# Patient Record
Sex: MALE | Race: BLACK OR AFRICAN AMERICAN | NOT HISPANIC OR LATINO | URBAN - METROPOLITAN AREA
[De-identification: names, ages, dates, MRNs, and addresses within clinical notes are randomized per-mention and may not be internally consistent; named-entity substitution may affect disease eponyms.]

---

## 2021-08-27 ENCOUNTER — OFFICE VISIT (OUTPATIENT)
Dept: URBAN - METROPOLITAN AREA SURGERY CENTER 19 | Facility: SURGERY CENTER | Age: 46
End: 2021-08-27
Payer: COMMERCIAL

## 2021-08-27 DIAGNOSIS — Z12.11 AVERAGE RISK FOR CRC. DUE TO PT'S CO-MORBID STATE WITH END STAGE DEMENTIA, HIGH RISK FOR ANESTHESIA (PER NEUROLOGY); INABILITY TO TAKE A BOWEL PREP....WOULD NOT ADVISE ANY COLORECTAL CANCER SCREENING INCLUDING STOOL TEST FOR FECAL BLOOD.: ICD-10-CM

## 2021-08-27 PROCEDURE — 45378 DIAGNOSTIC COLONOSCOPY: CPT | Performed by: INTERNAL MEDICINE

## 2022-01-06 ENCOUNTER — OFFICE VISIT (OUTPATIENT)
Dept: URBAN - METROPOLITAN AREA CLINIC 74 | Facility: CLINIC | Age: 47
End: 2022-01-06

## 2022-02-03 ENCOUNTER — LAB OUTSIDE AN ENCOUNTER (OUTPATIENT)
Dept: URBAN - METROPOLITAN AREA CLINIC 74 | Facility: CLINIC | Age: 47
End: 2022-02-03

## 2022-02-03 ENCOUNTER — OFFICE VISIT (OUTPATIENT)
Dept: URBAN - METROPOLITAN AREA CLINIC 74 | Facility: CLINIC | Age: 47
End: 2022-02-03
Payer: COMMERCIAL

## 2022-02-03 VITALS
HEIGHT: 68 IN | DIASTOLIC BLOOD PRESSURE: 90 MMHG | BODY MASS INDEX: 47.74 KG/M2 | HEART RATE: 71 BPM | TEMPERATURE: 97.7 F | OXYGEN SATURATION: 96 % | SYSTOLIC BLOOD PRESSURE: 140 MMHG | WEIGHT: 315 LBS

## 2022-02-03 DIAGNOSIS — R05.3 CHRONIC COUGH: ICD-10-CM

## 2022-02-03 DIAGNOSIS — R10.31 RIGHT LOWER QUADRANT ABDOMINAL PAIN: ICD-10-CM

## 2022-02-03 DIAGNOSIS — K21.9 GERD WITHOUT ESOPHAGITIS: ICD-10-CM

## 2022-02-03 PROCEDURE — 99204 OFFICE O/P NEW MOD 45 MIN: CPT | Performed by: INTERNAL MEDICINE

## 2022-02-03 RX ORDER — BENAZEPRIL HYDROCHLORIDE 10 MG/1
1 TABLET TABLET, FILM COATED ORAL ONCE A DAY
Status: ACTIVE | COMMUNITY

## 2022-02-03 NOTE — PHYSICAL EXAM CONSTITUTIONAL:
morbidly obese , in no acute distress , ambulating without difficulty , normal communication ability

## 2022-02-03 NOTE — HPI-TODAY'S VISIT:
47-year-old -American male here for fatty liver disease.  He has been experiencing right lower quadrant abdominal pain for more than a month.  It is a constant pain.  Complete abdominal ultrasound only showed fatty liver- was otherwise okay.  He has hypertension and hyperlipidemia.  He is morbidly obese as well.  Occasional alcohol.  LFTs were normal.  Colonoscopy last year was normal per the patient.  Maternal grandfather had colon cancer at over 60 years of age.  No prior upper endoscopy.  Intermittent acid reflux.  Has a chronic cough and globus sensation.

## 2022-02-03 NOTE — PHYSICAL EXAM GASTROINTESTINAL
Abdomen , soft, TTP in RLQ nondistended , no guarding or rigidity , no masses palpable , normal bowel sounds , Liver and Spleen , no hepatomegaly present , no hepatosplenomegaly , liver nontender , spleen not palpable

## 2022-02-04 ENCOUNTER — TELEPHONE ENCOUNTER (OUTPATIENT)
Dept: URBAN - METROPOLITAN AREA CLINIC 96 | Facility: CLINIC | Age: 47
End: 2022-02-04

## 2022-02-11 ENCOUNTER — TELEPHONE ENCOUNTER (OUTPATIENT)
Dept: URBAN - METROPOLITAN AREA CLINIC 74 | Facility: CLINIC | Age: 47
End: 2022-02-11

## 2022-02-16 ENCOUNTER — TELEPHONE ENCOUNTER (OUTPATIENT)
Dept: URBAN - METROPOLITAN AREA CLINIC 23 | Facility: CLINIC | Age: 47
End: 2022-02-16

## 2022-03-01 ENCOUNTER — TELEPHONE ENCOUNTER (OUTPATIENT)
Dept: URBAN - METROPOLITAN AREA CLINIC 40 | Facility: CLINIC | Age: 47
End: 2022-03-01

## 2022-03-01 ENCOUNTER — OFFICE VISIT (OUTPATIENT)
Dept: URBAN - METROPOLITAN AREA MEDICAL CENTER 30 | Facility: MEDICAL CENTER | Age: 47
End: 2022-03-01

## 2022-03-02 ENCOUNTER — TELEPHONE ENCOUNTER (OUTPATIENT)
Dept: URBAN - METROPOLITAN AREA CLINIC 74 | Facility: CLINIC | Age: 47
End: 2022-03-02

## 2022-03-15 ENCOUNTER — OFFICE VISIT (OUTPATIENT)
Dept: URBAN - METROPOLITAN AREA MEDICAL CENTER 9 | Facility: MEDICAL CENTER | Age: 47
End: 2022-03-15
Payer: COMMERCIAL

## 2022-03-15 ENCOUNTER — OFFICE VISIT (OUTPATIENT)
Dept: URBAN - METROPOLITAN AREA MEDICAL CENTER 9 | Facility: MEDICAL CENTER | Age: 47
End: 2022-03-15

## 2022-03-15 DIAGNOSIS — K22.2 ACQUIRED ESOPHAGEAL RING: ICD-10-CM

## 2022-03-15 DIAGNOSIS — R13.19 CERVICAL DYSPHAGIA: ICD-10-CM

## 2022-03-15 DIAGNOSIS — K22.89 ESOPHAGEAL BLEED, NON-VARICEAL: ICD-10-CM

## 2022-03-15 DIAGNOSIS — R10.13 ABDOMINAL DISCOMFORT, EPIGASTRIC: ICD-10-CM

## 2022-03-15 DIAGNOSIS — K29.60 ADENOPAPILLOMATOSIS GASTRICA: ICD-10-CM

## 2022-03-15 PROCEDURE — 43450 DILATE ESOPHAGUS 1/MULT PASS: CPT | Performed by: INTERNAL MEDICINE

## 2022-03-15 PROCEDURE — 43239 EGD BIOPSY SINGLE/MULTIPLE: CPT | Performed by: INTERNAL MEDICINE

## 2022-03-22 ENCOUNTER — TELEPHONE ENCOUNTER (OUTPATIENT)
Dept: URBAN - METROPOLITAN AREA CLINIC 92 | Facility: CLINIC | Age: 47
End: 2022-03-22

## 2022-03-22 ENCOUNTER — OFFICE VISIT (OUTPATIENT)
Dept: URBAN - METROPOLITAN AREA TELEHEALTH 2 | Facility: TELEHEALTH | Age: 47
End: 2022-03-22
Payer: COMMERCIAL

## 2022-03-22 VITALS — BODY MASS INDEX: 47.74 KG/M2 | HEIGHT: 68 IN | WEIGHT: 315 LBS

## 2022-03-22 DIAGNOSIS — K21.9 GERD WITHOUT ESOPHAGITIS: ICD-10-CM

## 2022-03-22 DIAGNOSIS — R05.3 CHRONIC COUGH: ICD-10-CM

## 2022-03-22 DIAGNOSIS — R10.31 RIGHT LOWER QUADRANT ABDOMINAL PAIN: ICD-10-CM

## 2022-03-22 PROCEDURE — 99213 OFFICE O/P EST LOW 20 MIN: CPT | Performed by: INTERNAL MEDICINE

## 2022-03-22 RX ORDER — PANTOPRAZOLE SODIUM 20 MG/1
1 TABLET TABLET, DELAYED RELEASE ORAL ONCE A DAY
Qty: 90 TABLET | Refills: 2

## 2022-03-22 RX ORDER — BENAZEPRIL HYDROCHLORIDE 10 MG/1
1 TABLET TABLET, FILM COATED ORAL ONCE A DAY
Status: ACTIVE | COMMUNITY

## 2022-03-22 RX ORDER — PANTOPRAZOLE SODIUM 40 MG/1
1 TABLET TABLET, DELAYED RELEASE ORAL ONCE A DAY
Status: ACTIVE | COMMUNITY

## 2022-03-22 NOTE — HPI-TODAY'S VISIT:
47-year-old -American male here for fatty liver disease.  He has been experiencing right lower quadrant abdominal pain for more than a month. Getting better with protonix. Complete abdominal ultrasound only showed fatty liver- was otherwise okay.  He has hypertension and hyperlipidemia.  He is morbidly obese as well.  Occasional alcohol.  LFTs were normal.  Colonoscopy last year was normal per the patient.  Maternal grandfather had colon cancer at over 60 years of age.  Intermittent acid reflux.  Has a chronic cough and globus sensation-manolo now.  EGD from last week-esophageal stricture dilated with 52 Fr Vazquez/mild gastritis. Bx: no h.pylori. CT showed-no acute abnormality. fatty liver. encouraged to lose weight

## 2022-03-29 ENCOUNTER — OFFICE VISIT (OUTPATIENT)
Dept: URBAN - METROPOLITAN AREA SURGERY CENTER 30 | Facility: SURGERY CENTER | Age: 47
End: 2022-03-29

## 2022-06-16 ENCOUNTER — OFFICE VISIT (OUTPATIENT)
Dept: URBAN - METROPOLITAN AREA MEDICAL CENTER 9 | Facility: MEDICAL CENTER | Age: 47
End: 2022-06-16

## 2022-09-27 ENCOUNTER — OFFICE VISIT (OUTPATIENT)
Dept: URBAN - METROPOLITAN AREA CLINIC 40 | Facility: CLINIC | Age: 47
End: 2022-09-27

## 2022-10-04 ENCOUNTER — OFFICE VISIT (OUTPATIENT)
Dept: URBAN - METROPOLITAN AREA CLINIC 40 | Facility: CLINIC | Age: 47
End: 2022-10-04
Payer: COMMERCIAL

## 2022-10-04 VITALS
DIASTOLIC BLOOD PRESSURE: 62 MMHG | HEART RATE: 78 BPM | SYSTOLIC BLOOD PRESSURE: 110 MMHG | WEIGHT: 315 LBS | TEMPERATURE: 97.5 F | BODY MASS INDEX: 47.74 KG/M2 | HEIGHT: 68 IN

## 2022-10-04 DIAGNOSIS — K22.2 ESOPHAGEAL STRICTURE: ICD-10-CM

## 2022-10-04 DIAGNOSIS — K21.9 GERD WITHOUT ESOPHAGITIS: ICD-10-CM

## 2022-10-04 DIAGNOSIS — R10.31 RIGHT LOWER QUADRANT ABDOMINAL PAIN: ICD-10-CM

## 2022-10-04 DIAGNOSIS — R05.3 CHRONIC COUGH: ICD-10-CM

## 2022-10-04 PROBLEM — 266435005: Status: ACTIVE | Noted: 2022-02-03

## 2022-10-04 PROBLEM — 68154008: Status: ACTIVE | Noted: 2022-02-03

## 2022-10-04 PROBLEM — 63305008: Status: ACTIVE | Noted: 2022-10-04

## 2022-10-04 PROCEDURE — 99213 OFFICE O/P EST LOW 20 MIN: CPT | Performed by: INTERNAL MEDICINE

## 2022-10-04 RX ORDER — PANTOPRAZOLE SODIUM 20 MG/1
1 TABLET TABLET, DELAYED RELEASE ORAL ONCE A DAY
Qty: 90 TABLET | Refills: 2 | Status: ACTIVE | COMMUNITY

## 2022-10-04 RX ORDER — BENAZEPRIL HYDROCHLORIDE 10 MG/1
1 TABLET TABLET, FILM COATED ORAL ONCE A DAY
Status: ACTIVE | COMMUNITY

## 2022-10-04 NOTE — HPI-TODAY'S VISIT:
47-year-old -American male here for fatty liver disease.  Complete abdominal ultrasound only showed fatty liver- was otherwise okay.  He has hypertension and hyperlipidemia.  He is morbidly obese as well.  started ozempic today. Occasional alcohol.  LFTs were normal.  Colonoscopy last year was normal per the patient.  Maternal grandfather had colon cancer at over 60 years of age.  Intermittent acid reflux.  not needing protonix now. Has a chronic cough and globus sensation-better now.  EGD from earlier this year-esophageal stricture dilated with 52 Fr Vazquez/mild gastritis. Bx: no h.pylori. No further dysphagia. CT showed-no acute abnormality. fatty liver. encouraged to lose weight

## 2023-09-11 PROBLEM — 197321007: Status: ACTIVE | Noted: 2023-09-11

## 2023-10-13 ENCOUNTER — LAB OUTSIDE AN ENCOUNTER (OUTPATIENT)
Dept: URBAN - METROPOLITAN AREA CLINIC 74 | Facility: CLINIC | Age: 48
End: 2023-10-13

## 2023-10-13 ENCOUNTER — OFFICE VISIT (OUTPATIENT)
Dept: URBAN - METROPOLITAN AREA CLINIC 74 | Facility: CLINIC | Age: 48
End: 2023-10-13
Payer: COMMERCIAL

## 2023-10-13 VITALS
HEART RATE: 79 BPM | DIASTOLIC BLOOD PRESSURE: 100 MMHG | BODY MASS INDEX: 47.74 KG/M2 | WEIGHT: 315 LBS | TEMPERATURE: 97.6 F | SYSTOLIC BLOOD PRESSURE: 140 MMHG | HEIGHT: 68 IN

## 2023-10-13 DIAGNOSIS — K76.9 LIVER LESION: ICD-10-CM

## 2023-10-13 DIAGNOSIS — R16.0 HEPATOMEGALY: ICD-10-CM

## 2023-10-13 DIAGNOSIS — R74.01 TRANSAMINITIS: ICD-10-CM

## 2023-10-13 DIAGNOSIS — K76.0 HEPATIC STEATOSIS: ICD-10-CM

## 2023-10-13 PROBLEM — 300331000: Status: ACTIVE | Noted: 2023-10-13

## 2023-10-13 PROCEDURE — 99213 OFFICE O/P EST LOW 20 MIN: CPT | Performed by: PHYSICIAN ASSISTANT

## 2023-10-13 RX ORDER — PANTOPRAZOLE SODIUM 20 MG/1
1 TABLET TABLET, DELAYED RELEASE ORAL ONCE A DAY
Qty: 90 TABLET | Refills: 2 | Status: ACTIVE | COMMUNITY

## 2023-10-13 RX ORDER — BENAZEPRIL HYDROCHLORIDE 10 MG/1
1 TABLET TABLET, FILM COATED ORAL ONCE A DAY
Status: ACTIVE | COMMUNITY

## 2023-10-13 RX ORDER — SEMAGLUTIDE 0.68 MG/ML
AS DIRECTED INJECTION, SOLUTION SUBCUTANEOUS
Status: ACTIVE | COMMUNITY

## 2023-10-13 NOTE — HPI-TODAY'S VISIT:
The patient is 48-year-old male with past medical history as noted below known to [facility] Dr. Farnsworth and Dr. Morales is presenting to our clinic today for evaluation of fatty liver. No GI issues today.   -- The patient denies dyspepsia, dysphagia, odynophagia, hemoptysis, hematemesis, nausea, vomiting, regurgitation, melena, constipation, diarrhea, abdominal pain, hematochezia, fever, chills, chest pain, SOB, or any other GI complaints today. -- The patient denies ETOH, Tobacco, and Illicit drug use. -- The patient is up to date with Flu and COVID vaccine. -- Denies NSAID's.   Diagnostic studies: -- RUQ US on 08/17/2023 with hepatomegaly and diffuse hepatic steatosis. Ill-defined hypoechoic lesion within the central liver, favored to represent focal fatty sparing albeit indeterminate. Further imaging might be considered as detailed above. Limited examination with nonvisualization of the common bile duct.  -- CT scan on 02/21/2022 with no acute intra-abdominal abnormality.  Scattered diverticula of the colon without evidence of diverticulitis.  Liver with possible mild fatty infiltration.  Gallbladder, pancreas, spleen, adrenal glands, and kidneys are normal.   Procedures: -- EGD with biopsy on 03/15/2022 by Dr. Morales noted LA grade B reflux esophagitis with no bleeding.  Benign-appearing esophageal stenosis.  Dilated.  Chronic gastritis.  Normal examined duodenum.  Biopsy with mild chronic gastritis.  No H.pylori organisms.  No intestinal metaplasia.  -- Colonoscopy on 08/27/2021 by Dr. Farnsworth noted internal hemorrhoids.  The examination was otherwise normal.  No specimen collected.  Repeat colonoscopy in 5 years for surveillance.

## 2023-10-26 LAB
% SATURATION: 28
A/G RATIO: 1.6
ABSOLUTE BASOPHILS: 30
ABSOLUTE EOSINOPHILS: 130
ABSOLUTE LYMPHOCYTES: 1775
ABSOLUTE MONOCYTES: 380
ABSOLUTE NEUTROPHILS: 2685
ACTIN (SMOOTH MUSCLE) ANTIBODY (IGG): <20
ALBUMIN: 3.9
ALKALINE PHOSPHATASE: 68
ALT (SGPT): 47
ANA SCREEN, IFA: NEGATIVE
AST (SGOT): 21
BASOPHILS: 0.6
BILIRUBIN, TOTAL: 0.4
BUN/CREATININE RATIO: 13
BUN: 19
CALCIUM: 9.3
CARBON DIOXIDE, TOTAL: 27
CERULOPLASMIN: 28
CHLORIDE: 107
CREATININE: 1.49
EGFR: 58
EOSINOPHILS: 2.6
FERRITIN: 310
GGT: 23
GLOBULIN, TOTAL: 2.5
GLUCOSE: 95
HBSAG SCREEN: (no result)
HEMATOCRIT: 39.7
HEMOGLOBIN: 13.6
HEP A AB, IGM: (no result)
HEP B CORE AB, IGM: (no result)
HEPATITIS C ANTIBODY: (no result)
HEREDITARY HEMOCHROMATOSIS DNA MUT: (no result)
INR: 1
IRON BINDING CAPACITY: 293
IRON, TOTAL: 83
LYMPHOCYTES: 35.5
MCH: 30.5
MCHC: 34.3
MCV: 89
MITOCHONDRIAL (M2) ANTIBODY: <=20
MONOCYTES: 7.6
MPV: 10.3
NEUTROPHILS: 53.7
PLATELET COUNT: 280
POTASSIUM: 4.6
PROTEIN, TOTAL: 6.4
PT: 10.3
RDW: 13.1
RED BLOOD CELL COUNT: 4.46
RHEUMATOID FACTOR: <14
SJOGREN'S ANTIBODY (SS-A): (no result)
SJOGREN'S ANTIBODY (SS-B): (no result)
SODIUM: 140
SOLUBLE LIVER ANTIGEN (SLA) AUTOANTIBODY: <20.1
WHITE BLOOD CELL COUNT: 5

## 2023-11-16 ENCOUNTER — TELEPHONE ENCOUNTER (OUTPATIENT)
Dept: URBAN - METROPOLITAN AREA CLINIC 74 | Facility: CLINIC | Age: 48
End: 2023-11-16

## 2023-11-28 ENCOUNTER — OFFICE VISIT (OUTPATIENT)
Dept: URBAN - METROPOLITAN AREA CLINIC 74 | Facility: CLINIC | Age: 48
End: 2023-11-28
Payer: COMMERCIAL

## 2023-11-28 ENCOUNTER — DASHBOARD ENCOUNTERS (OUTPATIENT)
Age: 48
End: 2023-11-28

## 2023-11-28 VITALS
HEART RATE: 62 BPM | HEIGHT: 68 IN | BODY MASS INDEX: 47.74 KG/M2 | SYSTOLIC BLOOD PRESSURE: 112 MMHG | OXYGEN SATURATION: 98 % | DIASTOLIC BLOOD PRESSURE: 68 MMHG | WEIGHT: 315 LBS | TEMPERATURE: 97 F

## 2023-11-28 DIAGNOSIS — K76.0 HEPATIC STEATOSIS: ICD-10-CM

## 2023-11-28 DIAGNOSIS — R74.01 TRANSAMINITIS: ICD-10-CM

## 2023-11-28 DIAGNOSIS — R16.0 HEPATOMEGALY: ICD-10-CM

## 2023-11-28 PROCEDURE — 99213 OFFICE O/P EST LOW 20 MIN: CPT | Performed by: PHYSICIAN ASSISTANT

## 2023-11-28 RX ORDER — BENAZEPRIL HYDROCHLORIDE 10 MG/1
1 TABLET TABLET, FILM COATED ORAL ONCE A DAY
Status: ACTIVE | COMMUNITY

## 2023-11-28 RX ORDER — SEMAGLUTIDE 0.68 MG/ML
AS DIRECTED INJECTION, SOLUTION SUBCUTANEOUS
Status: ACTIVE | COMMUNITY

## 2023-11-28 RX ORDER — PANTOPRAZOLE SODIUM 20 MG/1
1 TABLET TABLET, DELAYED RELEASE ORAL ONCE A DAY
Qty: 90 TABLET | Refills: 2 | Status: DISCONTINUED | COMMUNITY

## 2023-11-28 NOTE — HPI-TODAY'S VISIT:
The patient is 48-year-old male with past medical history as noted below known to [facility] Dr. Farnsworth and Dr. Morales is presenting to our clinic today to discuss his recent labs and MRI results. He has no GI issues today.   -- The patient denies dyspepsia, dysphagia, odynophagia, hemoptysis, hematemesis, nausea, vomiting, regurgitation, melena, constipation, diarrhea, abdominal pain, hematochezia, fever, chills, chest pain, SOB, or any other GI complaints today. -- The patient denies ETOH, Tobacco, and Illicit drug use. -- The patient is up to date with Flu and COVID vaccine. -- Denies NSAID's.   Diagnostic studies: -- MRI on 11/14/2023 with liver is enlarged measuring 21.4 cm. Artifact with suboptimal assessment for steatosis. Steatosis noted on prior imaging. Under the left confines, no measurable hepatic mass. Contracted gallbladder. No ductal dilatation.  Exophytic left lower pole 1.9 cm renal cyst.   -- Labs on 10/13/2023 Ceruloplasmin, GGT, ASMA, AMA, LISBETH, CBC, Fe panel, Ferritin, PT/INR, HHMA, ASL-Liver Ag, Acute Hepatitis Panel, and CMP with ALT 47.   -- RUQ US on 08/17/2023 with hepatomegaly and diffuse hepatic steatosis. Ill-defined hypoechoic lesion within the central liver, favored to represent focal fatty sparing albeit indeterminate. Further imaging might be considered as detailed above. Limited examination with nonvisualization of the common bile duct.  -- CT scan on 02/21/2022 with no acute intra-abdominal abnormality.  Scattered diverticula of the colon without evidence of diverticulitis.  Liver with possible mild fatty infiltration.  Gallbladder, pancreas, spleen, adrenal glands, and kidneys are normal.   Procedures: -- EGD with biopsy on 03/15/2022 by Dr. Morales noted LA grade B reflux esophagitis with no bleeding.  Benign-appearing esophageal stenosis.  Dilated.  Chronic gastritis.  Normal examined duodenum.  Biopsy with mild chronic gastritis.  No H.pylori organisms.  No intestinal metaplasia.  -- Colonoscopy on 08/27/2021 by Dr. Farnsworth noted internal hemorrhoids.  The examination was otherwise normal.  No specimen collected.  Repeat colonoscopy in 5 years for surveillance.